# Patient Record
Sex: MALE | URBAN - METROPOLITAN AREA
[De-identification: names, ages, dates, MRNs, and addresses within clinical notes are randomized per-mention and may not be internally consistent; named-entity substitution may affect disease eponyms.]

---

## 2020-07-25 ENCOUNTER — TELEPHONE ENCOUNTER (OUTPATIENT)
Dept: URBAN - METROPOLITAN AREA CLINIC 13 | Facility: CLINIC | Age: 20
End: 2020-07-25

## 2020-07-26 ENCOUNTER — TELEPHONE ENCOUNTER (OUTPATIENT)
Dept: URBAN - METROPOLITAN AREA CLINIC 13 | Facility: CLINIC | Age: 20
End: 2020-07-26

## 2023-07-19 ENCOUNTER — HOSPITAL ENCOUNTER (EMERGENCY)
Facility: HOSPITAL | Age: 23
Discharge: HOME | End: 2023-07-19
Attending: EMERGENCY MEDICINE | Admitting: EMERGENCY MEDICINE
Payer: COMMERCIAL

## 2023-07-19 ENCOUNTER — APPOINTMENT (OUTPATIENT)
Dept: CARDIOLOGY | Facility: HOSPITAL | Age: 23
End: 2023-07-19
Payer: COMMERCIAL

## 2023-07-19 ENCOUNTER — APPOINTMENT (OUTPATIENT)
Dept: GENERAL RADIOLOGY | Facility: HOSPITAL | Age: 23
End: 2023-07-19
Payer: COMMERCIAL

## 2023-07-19 VITALS
OXYGEN SATURATION: 97 % | BODY MASS INDEX: 21.82 KG/M2 | WEIGHT: 170 LBS | HEART RATE: 50 BPM | SYSTOLIC BLOOD PRESSURE: 118 MMHG | TEMPERATURE: 97.2 F | DIASTOLIC BLOOD PRESSURE: 73 MMHG | HEIGHT: 74 IN | RESPIRATION RATE: 16 BRPM

## 2023-07-19 DIAGNOSIS — R07.89 ATYPICAL CHEST PAIN: ICD-10-CM

## 2023-07-19 DIAGNOSIS — R00.2 PALPITATIONS: Primary | ICD-10-CM

## 2023-07-19 DIAGNOSIS — R20.2 PARESTHESIA: ICD-10-CM

## 2023-07-19 DIAGNOSIS — R06.00 DYSPNEA, UNSPECIFIED TYPE: ICD-10-CM

## 2023-07-19 LAB
ALBUMIN SERPL-MCNC: 4.5 G/DL (ref 3.5–5.2)
ALBUMIN/GLOB SERPL: 2.4 G/DL
ALP SERPL-CCNC: 49 U/L (ref 39–117)
ALT SERPL W P-5'-P-CCNC: 25 U/L (ref 1–41)
ANION GAP SERPL CALCULATED.3IONS-SCNC: 10 MMOL/L (ref 5–15)
AST SERPL-CCNC: 26 U/L (ref 1–40)
BASOPHILS # BLD AUTO: 0.04 10*3/MM3 (ref 0–0.2)
BASOPHILS NFR BLD AUTO: 0.7 % (ref 0–1.5)
BILIRUB SERPL-MCNC: 0.7 MG/DL (ref 0–1.2)
BUN SERPL-MCNC: 11 MG/DL (ref 6–20)
BUN/CREAT SERPL: 10.6 (ref 7–25)
CALCIUM SPEC-SCNC: 9.4 MG/DL (ref 8.6–10.5)
CHLORIDE SERPL-SCNC: 104 MMOL/L (ref 98–107)
CO2 SERPL-SCNC: 29 MMOL/L (ref 22–29)
CREAT SERPL-MCNC: 1.04 MG/DL (ref 0.76–1.27)
D DIMER PPP FEU-MCNC: <0.27 MCGFEU/ML (ref 0–0.5)
DEPRECATED RDW RBC AUTO: 41.4 FL (ref 37–54)
EGFRCR SERPLBLD CKD-EPI 2021: 102.8 ML/MIN/1.73
EOSINOPHIL # BLD AUTO: 0.11 10*3/MM3 (ref 0–0.4)
EOSINOPHIL NFR BLD AUTO: 1.9 % (ref 0.3–6.2)
ERYTHROCYTE [DISTWIDTH] IN BLOOD BY AUTOMATED COUNT: 12.1 % (ref 12.3–15.4)
GEN 5 2HR TROPONIN T REFLEX: <6 NG/L
GLOBULIN UR ELPH-MCNC: 1.9 GM/DL
GLUCOSE SERPL-MCNC: 128 MG/DL (ref 65–99)
HCT VFR BLD AUTO: 40.2 % (ref 37.5–51)
HGB BLD-MCNC: 14.1 G/DL (ref 13–17.7)
HOLD SPECIMEN: NORMAL
HOLD SPECIMEN: NORMAL
IMM GRANULOCYTES # BLD AUTO: 0.01 10*3/MM3 (ref 0–0.05)
IMM GRANULOCYTES NFR BLD AUTO: 0.2 % (ref 0–0.5)
INR PPP: 1.02 (ref 0.9–1.1)
LYMPHOCYTES # BLD AUTO: 1.85 10*3/MM3 (ref 0.7–3.1)
LYMPHOCYTES NFR BLD AUTO: 31.6 % (ref 19.6–45.3)
MAGNESIUM SERPL-MCNC: 2.1 MG/DL (ref 1.6–2.6)
MCH RBC QN AUTO: 32.7 PG (ref 26.6–33)
MCHC RBC AUTO-ENTMCNC: 35.1 G/DL (ref 31.5–35.7)
MCV RBC AUTO: 93.3 FL (ref 79–97)
MONOCYTES # BLD AUTO: 0.38 10*3/MM3 (ref 0.1–0.9)
MONOCYTES NFR BLD AUTO: 6.5 % (ref 5–12)
NEUTROPHILS NFR BLD AUTO: 3.46 10*3/MM3 (ref 1.7–7)
NEUTROPHILS NFR BLD AUTO: 59.1 % (ref 42.7–76)
NRBC BLD AUTO-RTO: 0 /100 WBC (ref 0–0.2)
NT-PROBNP SERPL-MCNC: 31.4 PG/ML (ref 0–450)
PLATELET # BLD AUTO: 197 10*3/MM3 (ref 140–450)
PMV BLD AUTO: 10.3 FL (ref 6–12)
POTASSIUM SERPL-SCNC: 4.3 MMOL/L (ref 3.5–5.2)
PROT SERPL-MCNC: 6.4 G/DL (ref 6–8.5)
PROTHROMBIN TIME: 13.5 SECONDS (ref 11.7–14.2)
QT INTERVAL: 375 MS
RBC # BLD AUTO: 4.31 10*6/MM3 (ref 4.14–5.8)
SODIUM SERPL-SCNC: 143 MMOL/L (ref 136–145)
TROPONIN T DELTA: NORMAL
TROPONIN T SERPL HS-MCNC: <6 NG/L
WBC NRBC COR # BLD: 5.85 10*3/MM3 (ref 3.4–10.8)
WHOLE BLOOD HOLD COAG: NORMAL
WHOLE BLOOD HOLD SPECIMEN: NORMAL

## 2023-07-19 PROCEDURE — 85610 PROTHROMBIN TIME: CPT | Performed by: EMERGENCY MEDICINE

## 2023-07-19 PROCEDURE — 71046 X-RAY EXAM CHEST 2 VIEWS: CPT

## 2023-07-19 PROCEDURE — 99284 EMERGENCY DEPT VISIT MOD MDM: CPT

## 2023-07-19 PROCEDURE — 85025 COMPLETE CBC W/AUTO DIFF WBC: CPT | Performed by: EMERGENCY MEDICINE

## 2023-07-19 PROCEDURE — 80053 COMPREHEN METABOLIC PANEL: CPT | Performed by: EMERGENCY MEDICINE

## 2023-07-19 PROCEDURE — 83735 ASSAY OF MAGNESIUM: CPT | Performed by: EMERGENCY MEDICINE

## 2023-07-19 PROCEDURE — 93005 ELECTROCARDIOGRAM TRACING: CPT

## 2023-07-19 PROCEDURE — 93010 ELECTROCARDIOGRAM REPORT: CPT | Performed by: STUDENT IN AN ORGANIZED HEALTH CARE EDUCATION/TRAINING PROGRAM

## 2023-07-19 PROCEDURE — 93005 ELECTROCARDIOGRAM TRACING: CPT | Performed by: EMERGENCY MEDICINE

## 2023-07-19 PROCEDURE — 93226 XTRNL ECG REC<48 HR SCAN A/R: CPT

## 2023-07-19 PROCEDURE — 84484 ASSAY OF TROPONIN QUANT: CPT | Performed by: EMERGENCY MEDICINE

## 2023-07-19 PROCEDURE — 83880 ASSAY OF NATRIURETIC PEPTIDE: CPT | Performed by: EMERGENCY MEDICINE

## 2023-07-19 PROCEDURE — 36415 COLL VENOUS BLD VENIPUNCTURE: CPT

## 2023-07-19 PROCEDURE — 85379 FIBRIN DEGRADATION QUANT: CPT | Performed by: EMERGENCY MEDICINE

## 2023-07-19 PROCEDURE — 93225 XTRNL ECG REC<48 HRS REC: CPT

## 2023-07-19 RX ORDER — SODIUM CHLORIDE 0.9 % (FLUSH) 0.9 %
10 SYRINGE (ML) INJECTION AS NEEDED
Status: DISCONTINUED | OUTPATIENT
Start: 2023-07-19 | End: 2023-07-19 | Stop reason: HOSPADM

## 2023-07-19 NOTE — ED PROVIDER NOTES
EMERGENCY DEPARTMENT ENCOUNTER    Room Number:  33/33  Date of encounter:  7/19/2023  PCP: Tamra Valencia MD  Historian: Patient  Relevant information and history provided by sources other than the patient will be included below and in the ED Course.  Review of pertinent past medical records may also be included in record below and ED Course.    HPI:  Chief Complaint: Palpitations, tingling in fingers and sweating in hands and fingers, chest discomfort  A complete HPI/ROS/PMH/PSH/SH/FH are unobtainable due to: Not applicable  Context: Roberth Azul is a 22 y.o. male who presents to the ED c/o the symptoms have been going off and on since May.  Patient was under lots of stress in May was taking several exams.  He really felt that it was due to stress and anxiety.  The symptoms have persisted after he was done with the exams.  He also was vaping at that time and also smoking marijuana and consuming caffeine to a fair amount.  He stopped vaping and smoking marijuana several weeks ago.  Any just stopped caffeine about 1 to 1-1/2 weeks ago.  He still having these episodes of very mild discomfort in the center of his chest.  He will also have palpitations.  The palpitations he describes the sensation that his heart is beating fast.  He does not have the sensation that he is skipping beats.  He will sometimes have very mild shortness of breath with this.  He will also get tingling in his hands and fingers and sometimes have sweating in his hands and fingers.  These episodes will occur anytime.  There is no specific triggers.  They might last several minutes to hours and then go away.  He has never been formally diagnosed with anxiety or panic before.  He does not have any history of heart problems.  He also has never had a history of DVT or blood clot and he is not aware of any family history of clotting disorder.  His grandfather has congestive heart failure but he smoked excessively and had other medical conditions.   He is brother does have a diagnosis of mitral valve prolapse.  Currently at this time he is asymptomatic.  No pain or shortness of breath.  He called his primary care physician today to see if he can arrange an appointment.  He has not seen any doctor for this as of yet.  After conversation with his primary care provider they recommended he go to the emergency department today.  Again there is no association with anything that brings or triggers these.  No association with exertion or physical activity.  They usually do occur at rest usually greater chance of occurring in the evening.  But again will occur anytime.        Previous Episodes: No  Current Symptoms: Only asymptomatic    MEDICAL HISTORY REVIEWED    Denies any chronic medical problems.  Denies any history of heart problems, lung problems.    PAST MEDICAL HISTORY  Active Ambulatory Problems     Diagnosis Date Noted    No Active Ambulatory Problems     Resolved Ambulatory Problems     Diagnosis Date Noted    No Resolved Ambulatory Problems     No Additional Past Medical History         PAST SURGICAL HISTORY  History reviewed. No pertinent surgical history.      FAMILY HISTORY  History reviewed. No pertinent family history.      SOCIAL HISTORY  Social History     Socioeconomic History    Marital status: Single         ALLERGIES  Patient has no known allergies.        REVIEW OF SYSTEMS  Review of Systems     All systems reviewed and negative except for those discussed in HPI.       PHYSICAL EXAM    I have reviewed the triage vital signs and nursing notes.    ED Triage Vitals   Temp Heart Rate Resp BP SpO2   07/19/23 1442 07/19/23 1442 07/19/23 1442 07/19/23 1445 07/19/23 1442   97.2 °F (36.2 °C) 82 16 163/92 99 %      Temp src Heart Rate Source Patient Position BP Location FiO2 (%)   07/19/23 1442 07/19/23 1442 07/19/23 1445 07/19/23 1731 --   Tympanic Monitor Sitting Right arm        GENERAL: Pleasant young male.  Appears a little anxious..  No acute  distress.Vital signs on my initial evaluation O2 sats 100% on room air.  Normal sinus rhythm on the monitor.  Rest of vitals are unremarkable.  HENT: nares patent  Head/neck/ face are symmetric without gross deformity, signs of trauma, or swelling  EYES: no scleral icterus, no conjunctival pallor.  NECK: Supple, no meningismus  CV: regular rhythm, regular rate with intact distal pulses.  No murmur or rub.  Normal inspection  RESPIRATORY: normal effort and no respiratory distress.  Clear to auscultation bilaterally  ABDOMEN: soft and nontender.  MUSCULOSKELETAL: no deformity.  No edema.  Intact distal pulses to upper and lower extremities that are equal strong and symmetric.  No swelling or pain.  NEURO: alert and appropriate, moves all extremities, follows commands.  No focal motor or sensory changes  SKIN: warm, dry    Vital signs and nursing notes reviewed.        LAB RESULTS  Recent Results (from the past 24 hour(s))   ECG 12 Lead Rhythm Change    Collection Time: 07/19/23  2:47 PM   Result Value Ref Range    QT Interval 375 ms   Green Top (Gel)    Collection Time: 07/19/23  5:05 PM   Result Value Ref Range    Extra Tube Hold for add-ons.    Lavender Top    Collection Time: 07/19/23  5:05 PM   Result Value Ref Range    Extra Tube hold for add-on    Gold Top - SST    Collection Time: 07/19/23  5:05 PM   Result Value Ref Range    Extra Tube Hold for add-ons.    Light Blue Top    Collection Time: 07/19/23  5:05 PM   Result Value Ref Range    Extra Tube Hold for add-ons.    BNP    Collection Time: 07/19/23  5:05 PM    Specimen: Blood   Result Value Ref Range    proBNP 31.4 0.0 - 450.0 pg/mL   Comprehensive Metabolic Panel    Collection Time: 07/19/23  5:05 PM    Specimen: Blood   Result Value Ref Range    Glucose 128 (H) 65 - 99 mg/dL    BUN 11 6 - 20 mg/dL    Creatinine 1.04 0.76 - 1.27 mg/dL    Sodium 143 136 - 145 mmol/L    Potassium 4.3 3.5 - 5.2 mmol/L    Chloride 104 98 - 107 mmol/L    CO2 29.0 22.0 - 29.0  mmol/L    Calcium 9.4 8.6 - 10.5 mg/dL    Total Protein 6.4 6.0 - 8.5 g/dL    Albumin 4.5 3.5 - 5.2 g/dL    ALT (SGPT) 25 1 - 41 U/L    AST (SGOT) 26 1 - 40 U/L    Alkaline Phosphatase 49 39 - 117 U/L    Total Bilirubin 0.7 0.0 - 1.2 mg/dL    Globulin 1.9 gm/dL    A/G Ratio 2.4 g/dL    BUN/Creatinine Ratio 10.6 7.0 - 25.0    Anion Gap 10.0 5.0 - 15.0 mmol/L    eGFR 102.8 >60.0 mL/min/1.73   D-dimer, Quantitative    Collection Time: 07/19/23  5:05 PM    Specimen: Blood   Result Value Ref Range    D-Dimer, Quantitative <0.27 0.00 - 0.50 MCGFEU/mL   High Sensitivity Troponin T    Collection Time: 07/19/23  5:05 PM    Specimen: Blood   Result Value Ref Range    HS Troponin T <6 <15 ng/L   Magnesium    Collection Time: 07/19/23  5:05 PM    Specimen: Blood   Result Value Ref Range    Magnesium 2.1 1.6 - 2.6 mg/dL   Protime-INR    Collection Time: 07/19/23  5:05 PM    Specimen: Blood   Result Value Ref Range    Protime 13.5 11.7 - 14.2 Seconds    INR 1.02 0.90 - 1.10   CBC Auto Differential    Collection Time: 07/19/23  5:05 PM    Specimen: Blood   Result Value Ref Range    WBC 5.85 3.40 - 10.80 10*3/mm3    RBC 4.31 4.14 - 5.80 10*6/mm3    Hemoglobin 14.1 13.0 - 17.7 g/dL    Hematocrit 40.2 37.5 - 51.0 %    MCV 93.3 79.0 - 97.0 fL    MCH 32.7 26.6 - 33.0 pg    MCHC 35.1 31.5 - 35.7 g/dL    RDW 12.1 (L) 12.3 - 15.4 %    RDW-SD 41.4 37.0 - 54.0 fl    MPV 10.3 6.0 - 12.0 fL    Platelets 197 140 - 450 10*3/mm3    Neutrophil % 59.1 42.7 - 76.0 %    Lymphocyte % 31.6 19.6 - 45.3 %    Monocyte % 6.5 5.0 - 12.0 %    Eosinophil % 1.9 0.3 - 6.2 %    Basophil % 0.7 0.0 - 1.5 %    Immature Grans % 0.2 0.0 - 0.5 %    Neutrophils, Absolute 3.46 1.70 - 7.00 10*3/mm3    Lymphocytes, Absolute 1.85 0.70 - 3.10 10*3/mm3    Monocytes, Absolute 0.38 0.10 - 0.90 10*3/mm3    Eosinophils, Absolute 0.11 0.00 - 0.40 10*3/mm3    Basophils, Absolute 0.04 0.00 - 0.20 10*3/mm3    Immature Grans, Absolute 0.01 0.00 - 0.05 10*3/mm3    nRBC 0.0 0.0 - 0.2  /100 WBC   High Sensitivity Troponin T 2Hr    Collection Time: 07/19/23  7:04 PM    Specimen: Blood   Result Value Ref Range    HS Troponin T <6 <15 ng/L    Troponin T Delta         Ordered the above labs and independently reviewed the results.        RADIOLOGY  XR Chest 2 View    Result Date: 7/19/2023  CHEST TWO VIEWS  HISTORY:  Chest pain.  COMPARISON: None.  PA and lateral views of the chest demonstrate the heart to be within normal limits in size. There is no evidence of infiltrate, effusion or of congestive failure.  This report was finalized on 7/19/2023 8:42 PM by Dr. Bobby May M.D.       I ordered the above noted radiological studies. Reviewed by me and discussed with radiologist.  See dictation for official radiology interpretation.      PROCEDURES    Procedures      MEDICATIONS GIVEN IN ER    Medications - No data to display        All labs have been independently reviewed by me.  All radiology studies have been reviewed by me and I discussed with radiologist dictating the report when indicated below.  All EKG's independently viewed and interpreted by me.  Discussion below represents my analysis of pertinent findings related to patient's condition, differential diagnosis, treatment plan and final disposition.        PROGRESS, DATA ANALYSIS, CONSULTS, AND MEDICAL DECISION MAKING    DDx includes acute coronary syndrome, pulmonary embolism, thoracic aortic dissection, pneumonia, pneumothorax, musculoskeletal pain, GERD or esophageal spasm, PUD, esophagitis, anxiety, myocarditis/pericarditis, esophageal rupture, pancreatitis.     This patient's symptoms do not sound like a serious etiology.  He has not had them before.  They have been occurring episodically since May.  Very potentially related to anxiety or stress.  I informed him that we will check blood work, chest x-ray.  I have looked at the EKG and it was unremarkable.  All questions answered      ED Course as of 07/19/23 2303 Wed Jul 19, 2023    1807 My own independent rotation of this EKG that was done at 1447 reveals a rate of 71 it is sinus rhythm there is nonspecific intraventricular duction that has the appearance of an incomplete right bundle branch block.  with normal axis there are some nonspecific ST and T wave changes but are appreciating acute injury pattern QT looks normal  No old EKG to compare with [MM]   1935 D-Dimer, Quant: <0.27 [MM]   1936 My own independent interpretation of the chest x-ray is unremarkable.  No pulmonary consolidation no signs of pneumothorax or pulmonary infection.  Cardiomediastinal silhouette appears normal.  I did review the radiologist report as well.  Please see complete dictated report from radiologist [MM]   1959 I reevaluated the patient.  He is stable in no distress.  O2 sats 100% on room air with normal heart rate and normal blood pressure.  I do not think he has a serious or emergent cause of his symptoms.  His tests and EKG look unremarkable.  We will get a put a Holter monitor on him.  We will get him follow-up with cardiology.  I informed patient that no test is 100% and if he has worsening of symptoms develop any fever or chills to return to the emergency department.  He also would like to follow-up with cardiology.  There is concern that he feels he might have mitral valve prolapse.  All questions answered at this time. [MM]      ED Course User Index  [MM] Lio Mariee MD       AS OF 23:03 EDT VITALS:    BP - 118/73  HR - 50  TEMP - 97.2 °F (36.2 °C) (Tympanic)  02 SATS - 97%    SOCIAL DETERMINANTS OF HEALTH THAT IMPACT OR LIMIT CARE (For example..Homelessness,safe discharge, inability to obtain care, follow up, or prescriptions):      DIAGNOSIS  Final diagnoses:   Palpitations   Atypical chest pain   Paresthesia in hands and fingers bilaterally   Dyspnea, unspecified type         DISPOSITION  DISCHARGE    Patient discharged in stable condition.    Reviewed implications of results, diagnosis, meds,  responsibility to follow up, warning signs and symptoms of possible worsening, potential complications and reasons to return to ER, including worsening of symptoms, worsening of pain, fever, shortness of breath, any concerns..    Patient/Family voiced understanding of above instructions.    Discussed plan for discharge, as there is no emergent indication for admission. Pt/family is agreeable and understands need for follow up and repeat testing.  Pt is aware that discharge does not mean that nothing is wrong but it indicates no emergency is present that requires admission and they must continue care with follow-up as given below or physician of their choice.     FOLLOW-UP  Tamra Valencia MD  [address]  [address]  [facility] [address]  [phone]    In 1 week  Return if pain worsens, If symptoms worsen, shortness of breath, fever, any concerns    [facility] CARDIOLOG[facility]  [address]  [address]  [facility] [facility] [address] [phone]    Call tomorrow to arrange follow-up in the next 1 to 2 weeks., Return if pain worsens, If symptoms worsen, shortness of breath, fever, any concerns         Medication List      No changes were made to your prescriptions during this visit.                 DICTATED UTILIZING DRAGON DICTATION    Note Disclaimer: At [facility], we believe that sharing information builds trust and better relationships. You are receiving this note because you recently visited [facility]. It is possible you will see health information before a provider has talked with you about it. This kind of information can be easy to misunderstand. To help you fully understand what it means for your health, we urge you to discuss this note with your provider.       Lio Mariee MD  07/19/23 2128

## 2023-07-24 PROCEDURE — 93227 XTRNL ECG REC<48 HR R&I: CPT | Performed by: INTERNAL MEDICINE
